# Patient Record
Sex: FEMALE | Race: WHITE | Employment: UNEMPLOYED | URBAN - METROPOLITAN AREA
[De-identification: names, ages, dates, MRNs, and addresses within clinical notes are randomized per-mention and may not be internally consistent; named-entity substitution may affect disease eponyms.]

---

## 2017-01-26 ENCOUNTER — HOSPITAL ENCOUNTER (INPATIENT)
Age: 22
LOS: 1 days | Discharge: HOME | DRG: 885 | End: 2017-01-27
Attending: EMERGENCY MEDICINE | Admitting: PSYCHIATRY & NEUROLOGY
Payer: COMMERCIAL

## 2017-01-26 VITALS
BODY MASS INDEX: 33.32 KG/M2 | WEIGHT: 200 LBS | DIASTOLIC BLOOD PRESSURE: 59 MMHG | RESPIRATION RATE: 16 BRPM | TEMPERATURE: 98.3 F | OXYGEN SATURATION: 98 % | HEART RATE: 85 BPM | SYSTOLIC BLOOD PRESSURE: 106 MMHG | HEIGHT: 65 IN

## 2017-01-26 DIAGNOSIS — R45.851 SUICIDAL IDEATIONS: Primary | ICD-10-CM

## 2017-01-26 PROBLEM — F32.89 DEPRESSIVE DISORDER, NOT ELSEWHERE CLASSIFIED: Status: ACTIVE | Noted: 2017-01-26

## 2017-01-26 LAB
AMPHET UR QL SCN: NEGATIVE
ANION GAP BLD CALC-SCNC: 9 MMOL/L (ref 3–18)
APAP SERPL-MCNC: <2 UG/ML (ref 10–30)
APPEARANCE UR: ABNORMAL
BACTERIA URNS QL MICRO: ABNORMAL /HPF
BARBITURATES UR QL SCN: NEGATIVE
BASOPHILS # BLD AUTO: 0 K/UL (ref 0–0.1)
BASOPHILS # BLD: 0 % (ref 0–2)
BENZODIAZ UR QL: NEGATIVE
BILIRUB UR QL: NEGATIVE
BUN SERPL-MCNC: 8 MG/DL (ref 7–18)
BUN/CREAT SERPL: 10 (ref 12–20)
CALCIUM SERPL-MCNC: 9.2 MG/DL (ref 8.5–10.1)
CANNABINOIDS UR QL SCN: NEGATIVE
CHLORIDE SERPL-SCNC: 105 MMOL/L (ref 100–108)
CO2 SERPL-SCNC: 26 MMOL/L (ref 21–32)
COCAINE UR QL SCN: NEGATIVE
COLOR UR: YELLOW
CREAT SERPL-MCNC: 0.79 MG/DL (ref 0.6–1.3)
DIFFERENTIAL METHOD BLD: ABNORMAL
EOSINOPHIL # BLD: 0.1 K/UL (ref 0–0.4)
EOSINOPHIL NFR BLD: 1 % (ref 0–5)
EPITH CASTS URNS QL MICRO: ABNORMAL /LPF (ref 0–5)
ERYTHROCYTE [DISTWIDTH] IN BLOOD BY AUTOMATED COUNT: 14.8 % (ref 11.6–14.5)
ETHANOL SERPL-MCNC: <3 MG/DL (ref 0–3)
GLUCOSE SERPL-MCNC: 106 MG/DL (ref 74–99)
GLUCOSE UR STRIP.AUTO-MCNC: NEGATIVE MG/DL
HCG UR QL: NEGATIVE
HCT VFR BLD AUTO: 44.9 % (ref 35–45)
HDSCOM,HDSCOM: NORMAL
HGB BLD-MCNC: 14.3 G/DL (ref 12–16)
HGB UR QL STRIP: NEGATIVE
KETONES UR QL STRIP.AUTO: NEGATIVE MG/DL
LEUKOCYTE ESTERASE UR QL STRIP.AUTO: ABNORMAL
LYMPHOCYTES # BLD AUTO: 34 % (ref 21–52)
LYMPHOCYTES # BLD: 3.4 K/UL (ref 0.9–3.6)
MCH RBC QN AUTO: 25.2 PG (ref 24–34)
MCHC RBC AUTO-ENTMCNC: 31.8 G/DL (ref 31–37)
MCV RBC AUTO: 79.2 FL (ref 74–97)
METHADONE UR QL: NEGATIVE
MONOCYTES # BLD: 0.5 K/UL (ref 0.05–1.2)
MONOCYTES NFR BLD AUTO: 5 % (ref 3–10)
MUCOUS THREADS URNS QL MICRO: ABNORMAL /LPF
NEUTS SEG # BLD: 5.9 K/UL (ref 1.8–8)
NEUTS SEG NFR BLD AUTO: 60 % (ref 40–73)
NITRITE UR QL STRIP.AUTO: NEGATIVE
OPIATES UR QL: NEGATIVE
PCP UR QL: NEGATIVE
PH UR STRIP: 5.5 [PH] (ref 5–8)
PLATELET # BLD AUTO: 271 K/UL (ref 135–420)
PMV BLD AUTO: 10.7 FL (ref 9.2–11.8)
POTASSIUM SERPL-SCNC: 3.6 MMOL/L (ref 3.5–5.5)
PROT UR STRIP-MCNC: NEGATIVE MG/DL
RBC # BLD AUTO: 5.67 M/UL (ref 4.2–5.3)
RBC #/AREA URNS HPF: NEGATIVE /HPF (ref 0–5)
SALICYLATES SERPL-MCNC: <2.8 MG/DL (ref 2.8–20)
SODIUM SERPL-SCNC: 140 MMOL/L (ref 136–145)
SP GR UR REFRACTOMETRY: 1.02 (ref 1–1.03)
UROBILINOGEN UR QL STRIP.AUTO: 0.2 EU/DL (ref 0.2–1)
WBC # BLD AUTO: 9.8 K/UL (ref 4.6–13.2)
WBC URNS QL MICRO: ABNORMAL /HPF (ref 0–4)

## 2017-01-26 PROCEDURE — 80307 DRUG TEST PRSMV CHEM ANLYZR: CPT | Performed by: EMERGENCY MEDICINE

## 2017-01-26 PROCEDURE — 65220000001 HC RM PRIVATE PSYCH

## 2017-01-26 PROCEDURE — 99284 EMERGENCY DEPT VISIT MOD MDM: CPT

## 2017-01-26 PROCEDURE — 81001 URINALYSIS AUTO W/SCOPE: CPT | Performed by: EMERGENCY MEDICINE

## 2017-01-26 PROCEDURE — 81025 URINE PREGNANCY TEST: CPT | Performed by: EMERGENCY MEDICINE

## 2017-01-26 PROCEDURE — 80048 BASIC METABOLIC PNL TOTAL CA: CPT | Performed by: EMERGENCY MEDICINE

## 2017-01-26 PROCEDURE — 85025 COMPLETE CBC W/AUTO DIFF WBC: CPT | Performed by: EMERGENCY MEDICINE

## 2017-01-26 RX ORDER — BENZTROPINE MESYLATE 1 MG/1
1-2 TABLET ORAL
Status: DISCONTINUED | OUTPATIENT
Start: 2017-01-26 | End: 2017-01-27 | Stop reason: HOSPADM

## 2017-01-26 RX ORDER — SERTRALINE HYDROCHLORIDE 100 MG/1
100 TABLET, FILM COATED ORAL DAILY
COMMUNITY

## 2017-01-26 RX ORDER — HALOPERIDOL 5 MG/1
5 TABLET ORAL
Status: DISCONTINUED | OUTPATIENT
Start: 2017-01-26 | End: 2017-01-27 | Stop reason: HOSPADM

## 2017-01-26 RX ORDER — BENZTROPINE MESYLATE 1 MG/ML
1-2 INJECTION INTRAMUSCULAR; INTRAVENOUS
Status: DISCONTINUED | OUTPATIENT
Start: 2017-01-26 | End: 2017-01-27 | Stop reason: HOSPADM

## 2017-01-26 RX ORDER — HALOPERIDOL 5 MG/ML
5 INJECTION INTRAMUSCULAR
Status: DISCONTINUED | OUTPATIENT
Start: 2017-01-26 | End: 2017-01-27 | Stop reason: HOSPADM

## 2017-01-26 RX ORDER — LORAZEPAM 1 MG/1
1-2 TABLET ORAL
Status: DISCONTINUED | OUTPATIENT
Start: 2017-01-26 | End: 2017-01-27 | Stop reason: HOSPADM

## 2017-01-26 RX ORDER — SERTRALINE HYDROCHLORIDE 50 MG/1
100 TABLET, FILM COATED ORAL DAILY
Status: DISCONTINUED | OUTPATIENT
Start: 2017-01-27 | End: 2017-01-27

## 2017-01-26 RX ORDER — LORAZEPAM 2 MG/ML
1-2 INJECTION INTRAMUSCULAR
Status: DISCONTINUED | OUTPATIENT
Start: 2017-01-26 | End: 2017-01-27 | Stop reason: HOSPADM

## 2017-01-26 RX ORDER — TRAZODONE HYDROCHLORIDE 50 MG/1
50 TABLET ORAL
Status: DISCONTINUED | OUTPATIENT
Start: 2017-01-26 | End: 2017-01-27 | Stop reason: HOSPADM

## 2017-01-26 RX ORDER — IBUPROFEN 600 MG/1
600 TABLET ORAL
Status: DISCONTINUED | OUTPATIENT
Start: 2017-01-26 | End: 2017-01-27 | Stop reason: HOSPADM

## 2017-01-26 NOTE — IP AVS SNAPSHOT
Current Discharge Medication List  
  
Take these medications at their scheduled times Dose & Instructions Dispensing Information Comments Morning Noon Evening Bedtime ZOLOFT 100 mg tablet Generic drug:  sertraline Your next dose is: Today, Tomorrow Other:  ____________ Dose:  100 mg Take 100 mg by mouth daily. Refills:  0
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
## 2017-01-26 NOTE — IP AVS SNAPSHOT
Summary of Care Report The Summary of Care report has been created to help improve care coordination. Users with access to View the Space or [address] (Web-based application) may access additional patient information including the Discharge Summary. If you are not currently a [address] user and need more information, please call the number listed below in the [place] [address] section and ask to be connected with Medical Records. Facility Information Name Address Phone [address] [facility]  [address] [facility] [id number] [phone] Patient Information Patient Name Sex  Colletta Rowels ([id number]) Female 1995 Discharge Information Admitting Provider Service Area Unit Alejandro Sprague MD / [address] 1 Adult Chem Dep / [phone] Discharge Provider Discharge Date/Time Discharge Disposition Destination (none) 2017 Afternoon (Pending) AHR (none) Patient Language Language ENGLISH [13] Problem List as of 2017  Never Reviewed Codes Priority Class Noted - Resolved Depressive disorder, not elsewhere classified ICD-10-CM: F32.9 ICD-9-CM: 899   2017 - Present You are allergic to the following No active allergies Current Discharge Medication List  
  
CONTINUE these medications which have NOT CHANGED Dose & Instructions Dispensing Information Comments ZOLOFT 100 mg tablet Generic drug:  sertraline Dose:  100 mg Take 100 mg by mouth daily. Refills:  0 Follow-up Information Follow up With Details Comments Contact Info None   None (395) Patient stated that they have no PCP  [address] [place] [address]  On 2017 Patient has an appointment scheduled with Aly Gunn at 8:00  a.m.  Ivonne Perez [address] 
 [place], [address] [facility] 
[phone] Discharge Instructions BEHAVIORAL HEALTH NURSING DISCHARGE NOTE Emergency Numbers [address] [facility] Desk: [phone] [facility] Emergency Services: [phone] Suicide Prevention 1 [phone] (TALK) The following personal items collected during your admission are returned to you:  
Dental Appliance: Dental Appliances: None Vision: Visual Aid: Glasses Hearing Aid:   
Jewelry: Jewelry: None Clothing:   
Other Valuables:   
Valuables sent to safe: The discharge information has been reviewed with the patient. The patient verbalized understanding. Swipe Telecomhart Activation Thank you for requesting access to Bazinga. Please follow the instructions below to securely access and download your online medical record. Bazinga allows you to send messages to your doctor, view your test results, renew your prescriptions, schedule appointments, and more. How Do I Sign Up? 1. In your internet browser, go to [email or web address] 
2. Click on the First Time User? Click Here link in the Sign In box. You will be redirect to the New Member Sign Up page. 3. Enter your Bazinga Access Code exactly as it appears below. You will not need to use this code after youve completed the sign-up process. If you do not sign up before the expiration date, you must request a new code. Bazinga Access Code: [email or web address] Expires: 2017  1:09 AM (This is the date your Bazinga access code will ) 4. Enter the last four digits of your Social Security Number (xxxx) and Date of Birth (mm/dd/yyyy) as indicated and click Submit. You will be taken to the next sign-up page. 5. Create a Bazinga ID. This will be your Bazinga login ID and cannot be changed, so think of one that is secure and easy to remember. 6. Create a Bazinga password. You can change your password at any time. 7. Enter your Password Reset Question and Answer.  This can be used at a later time if you forget your password. 8. Enter your e-mail address. You will receive e-mail notification when new information is available in [address] 9. Click Sign Up. You can now view and download portions of your medical record. 10. Click the Download Summary menu link to download a portable copy of your medical information. Additional Information If you have questions, please visit the Frequently Asked Questions section of the Buyt.In website at [email or web address] Remember, Buyt.In is NOT to be used for urgent needs. For medical emergencies, dial 911. Patient armband removed and shredded Chart Review Routing History No Routing History on File
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
## 2017-01-26 NOTE — BH NOTES
Van Lazaro is participating in Coping Skills Group. Group time: 30 minutes    Personal goal for participation: decrease anger    Goal orientation: social    Group therapy participation: active    Therapeutic interventions reviewed and discussed: Anger Map    Impression of participation: Pt.  Discuss  Coping skills to decrease  anger

## 2017-01-26 NOTE — ED NOTES
ADDENDUM      Patient's care was signed over to me at 0700 on 1/26/17. Patient's care signed over to me pending final disposition. Patient without complaints on my exam.   10:13 AM Dallin Zarate with crisis has evaluated pt and agrees with plan of admission. IMPRESSION:   1.  Suicidal ideations        DISPO: Admitted     Alex Jones MD

## 2017-01-26 NOTE — ED NOTES
Bedside and Verbal shift change report given to Jacob Cool RN (oncoming nurse) by Yong Ferreira RN (offgoing nurse). Report included the following information SBAR and ED Summary.

## 2017-01-26 NOTE — BH NOTES
Solange Mccain is participating in [employer]. Group time: 15 minutes    Personal goal for participation: Community Concerns    Goal orientation: community    Group therapy participation: active    Therapeutic interventions reviewed and discussed: Staff encouraged Pt. To report any repairs or Community Concerns    Impression of participation: Pt.  Had no repairs to report/community concerns at this time

## 2017-01-26 NOTE — ED TRIAGE NOTES
Pt states I am having suicidal thought since yesterday at 8pm to slit my wrist. Pt transported to ER by [employer].

## 2017-01-26 NOTE — IP AVS SNAPSHOT
Nazanin Jessa 
 
 
 [address] [facility] [address] Patient: Ashtyn Giles MRN: [id number] :1995 You are allergic to the following No active allergies Recent Documentation Height Weight Breastfeeding? BMI OB Status Smoking Status 1.651 m 90.7 kg No 33.28 kg/m2 Unknown Current Some Day Smoker Emergency Contacts Name Discharge Info Relation Home Work Mobile Gagan Abarca  Friend [5] [phone] About your hospitalization You were admitted on:  2017 You last received care in the:  [facility] 1 ADULT CHEM DEP You were discharged on:  2017 Unit phone number:  [phone] Why you were hospitalized Your primary diagnosis was:  Not on File Your diagnoses also included:  Depressive Disorder, Not Elsewhere Classified Providers Seen During Your Hospitalizations Provider Role Specialty Primary office phone James Urrutia MD Attending Provider Emergency Medicine [phone] Rayna Gomez MD Attending Provider Emergency Medicine [phone] Carley Corey MD Attending Provider Psychiatry [phone] Your Primary Care Physician (PCP) Primary Care Physician Office Phone Office Fax NONE ** None ** ** None ** Follow-up Information Follow up With Details Comments Contact Info None   None (395) Patient stated that they have no PCP [address] [place] [address]  On 2017 Patient has an appointment scheduled with Nemesio Murillo at 8:00  a.m.  [address] [place] [address], [address] [facility] 
[phone] Current Discharge Medication List  
  
CONTINUE these medications which have NOT CHANGED Dose & Instructions Dispensing Information Comments Morning Noon Evening Bedtime ZOLOFT 100 mg tablet Generic drug:  sertraline Your next dose is: Today, Tomorrow Other:  _________ Dose:  100 mg Take 100 mg by mouth daily. Refills:  0 Discharge Instructions BEHAVIORAL HEALTH NURSING DISCHARGE NOTE Emergency Numbers [facility] [facility] Desk: [phone] [place] Emergency Services: [phone] Suicide Prevention 1 [phone] (TALK) The following personal items collected during your admission are returned to you:  
Dental Appliance: Dental Appliances: None Vision: Visual Aid: Glasses Hearing Aid:   
Jewelry: Jewelry: None Clothing:   
Other Valuables:   
Valuables sent to safe: The discharge information has been reviewed with the patient. The patient verbalized understanding. Mydish Activation Thank you for requesting access to Mydish. Please follow the instructions below to securely access and download your online medical record. Mydish allows you to send messages to your doctor, view your test results, renew your prescriptions, schedule appointments, and more. How Do I Sign Up? 1. In your internet browser, go to [email or web address] 
2. Click on the First Time User? Click Here link in the Sign In box. You will be redirect to the New Member Sign Up page. 3. Enter your Mydish Access Code exactly as it appears below. You will not need to use this code after youve completed the sign-up process. If you do not sign up before the expiration date, you must request a new code. Mydish Access Code: [email or web address] Expires: 2017  1:09 AM (This is the date your Mydish access code will ) 4. Enter the last four digits of your Social Security Number (xxxx) and Date of Birth (mm/dd/yyyy) as indicated and click Submit. You will be taken to the next sign-up page. 5. Create a Mydish ID. This will be your Mydish login ID and cannot be changed, so think of one that is secure and easy to remember. 6. Create a Mydish password. You can change your password at any time. 7. Enter your Password Reset Question and Answer. This can be used at a later time if you forget your password. 8. Enter your e-mail address. You will receive e-mail notification when new information is available in [address] 9. Click Sign Up. You can now view and download portions of your medical record. 10. Click the Download Summary menu link to download a portable copy of your medical information. Additional Information If you have questions, please visit the Frequently Asked Questions section of the Capeco website at [email or web address] Remember, Capeco is NOT to be used for urgent needs. For medical emergencies, dial 911. Patient armband removed and shredded Discharge Orders None Capeco Announcement We are excited to announce that we are making your provider's discharge notes available to you in Capeco. You will see these notes when they are completed and signed by the physician that discharged you from your recent hospital stay. If you have any questions or concerns about any information you see in Capeco, please call the Health Information Department where you were seen or reach out to your Primary Care Provider for more information about your plan of care. Introducing [facility]! Corry Garcia introduces Capeco patient portal. Now you can access parts of your medical record, email your doctor's office, and request medication refills online. 1. In your internet browser, go to [email or web address] 2. Click on the First Time User? Click Here link in the Sign In box. You will see the New Member Sign Up page. 3. Enter your Capeco Access Code exactly as it appears below. You will not need to use this code after youve completed the sign-up process. If you do not sign up before the expiration date, you must request a new code. · Capeco Access Code: [email or web address] Expires: 4/26/2017  1:09 AM 
 
 4. Enter the last four digits of your Social Security Number (xxxx) and Date of Birth (mm/dd/yyyy) as indicated and click Submit. You will be taken to the next sign-up page. 5. Create a GeoPal Solutions ID. This will be your [employer] Solutions login ID and cannot be changed, so think of one that is secure and easy to remember. 6. Create a [employer] Solutions password. You can change your password at any time. 7. Enter your Password Reset Question and Answer. This can be used at a later time if you forget your password. 8. Enter your e-mail address. You will receive e-mail notification when new information is available in [address] 9. Click Sign Up. You can now view and download portions of your medical record. 10. Click the Download Summary menu link to download a portable copy of your medical information. If you have questions, please visit the Frequently Asked Questions section of the [employer] website. Remember, [employer]Pal Solutions is NOT to be used for urgent needs. For medical emergencies, dial 911. Now available from your iPhone and Android! General Information Please provide this summary of care documentation to your next provider. Patient Signature:  ____________________________________________________________ Date:  ____________________________________________________________  
  
[facility] Provider Signature:  ____________________________________________________________ Date:  ____________________________________________________________

## 2017-01-27 PROCEDURE — 74011250637 HC RX REV CODE- 250/637: Performed by: PSYCHIATRY & NEUROLOGY

## 2017-01-27 RX ORDER — SERTRALINE HYDROCHLORIDE 25 MG/1
25 TABLET, FILM COATED ORAL DAILY
Status: DISCONTINUED | OUTPATIENT
Start: 2017-01-27 | End: 2017-01-27 | Stop reason: HOSPADM

## 2017-01-27 RX ADMIN — SERTRALINE HYDROCHLORIDE 25 MG: 25 TABLET ORAL at 12:30

## 2017-01-27 NOTE — DISCHARGE SUMMARY
[facility]  Discharge Summary     Patient ID:  Ashtyn Giles  [id number]  46 y.o.  1995    Admit date: 1/26/2017    Discharge date and time: 1/27/17  1500      Admission Diagnoses: Depressive disorder, not elsewhere classified    Discharge Diagnoses:   Problem List as of 1/27/2017  Never Reviewed          Codes Class Noted - Resolved    Depressive disorder, not elsewhere classified ICD-10-CM: F32.9  ICD-9-CM: 315  1/26/2017 - Present              Disposition: home    Discharged Condition: stable    History reviewed. No pertinent past medical history. Family History   Problem Relation Age of Onset    No Known Problems Mother     No Known Problems Father     No Known Problems Sister     No Known Problems Brother       Social History   Substance Use Topics    Smoking status: Current Some Day Smoker     Packs/day: 0.25     Years: 3.00    Smokeless tobacco: Never Used    Alcohol use No     History reviewed. No pertinent past surgical history. Prior to Admission medications    Medication Sig Start Date End Date Taking? Authorizing Provider   sertraline (ZOLOFT) 100 mg tablet Take 100 mg by mouth daily. Phys Other, MD     No Known Allergies     Hospital Course: The patient was admitted to the [facility]  on suicide  precautions. The patient was engaged in individual, group, art  therapies, and occupational therapy. At  the time of discharge, the patient denied homicidal or suicidal ideation. Patient  was not psychotic and was capable of self care and competent to make their own financial and medical decisions. The patient has an understanding of treatment recommendations and medication management on discharge. Discharge Exams:  Mental Status exam: WNL   This afternoon the pt denies  suicidal ideation and says she will stay  in control- she wants to go home because she doesn't want to  miss her baby's first steps this weekend.  Pt has her sertraline at home   Physical exam: No exam performed today, . Lab/Data Review:  glucose  106       Consultations (including impressions and outcomes): none     Psychiatric Testing none    Treatment and Response: began sertraline 25 mg in hospital     Significant adverse reaction to drugs: none    Procedures/Operations:     Current Discharge Medication List      CONTINUE these medications which have NOT CHANGED    Details   sertraline (ZOLOFT) 100 mg tablet Take 100 mg by mouth daily.                Activity: Activity as tolerated  Diet: Regular Diet      Follow-up with: as arranged by nursing staff      Signed:  Veronia Osler, MD  1/27/2017  2:37 PM

## 2017-01-27 NOTE — BH NOTES
Pt. appears calm and cooperative in the milieu socializing with staff and peers. Pt. denies SI/HI. AV/H. Pt contracts for safety on the unit. Pt.denies any new medical/pain issues. Pt. completed ADL's. Pt. ate 100% of meals and has been compliant with medications. Pt. participate in community and anger group. Pt. Received a visit from a male friend. Staff encouraged Pt. to  participate in treatment and  medication therapy. Pt agreed. Pt. remain free of falls and provided non skid socks. Staff will continue to monitor Pt. for behavior safety and location.
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
## 2017-01-27 NOTE — BH NOTES
Marlene Vik is not participating in Substance abuse. Group time: 1 hour    Personal goal for participation: Educate on Substance abuse    Goal orientation: passive    Group therapy participation: refuse    Therapeutic interventions reviewed and discussed:  Staff encouraged Pt. To participate in group    Impression of participation: Pt. Refuse chose to rest in bed despite staff encouragement.

## 2017-01-27 NOTE — BH NOTES
GROUP THERAPY PROGRESS NOTE    Carol Tang is participating in [place]. Group time: 30 minutes    Personal goal for participation: I do not have a goal    Goal orientation: personal    Group therapy participation: minimal    Therapeutic interventions reviewed and discussed: Her affect was alert but minimal conversation during group. Impression of participation: Pt was educated on what a goal was and how to make goals that are attainable during this group.

## 2017-01-27 NOTE — DISCHARGE INSTRUCTIONS
BEHAVIORAL HEALTH NURSING DISCHARGE NOTE      Emergency Numbers  : [facility]: [phone]   Suicide Prevention 7 [phone] (TALK)      The following personal items collected during your admission are returned to you:   Dental Appliance: Dental Appliances: None  Vision: Visual Aid: Glasses  Hearing Aid:    Jewelry: Jewelry: None  Clothing:    Other Valuables:    Valuables sent to safe: The discharge information has been reviewed with the patient. The patient verbalized understanding. [employer] Activation    Thank you for requesting access to [employer]. Please follow the instructions below to securely access and download your online medical record. [employer] allows you to send messages to your doctor, view your test results, renew your prescriptions, schedule appointments, and more. How Do I Sign Up? 1. In your internet browser, go to [email or web address]  2. Click on the First Time User? Click Here link in the Sign In box. You will be redirect to the New Member Sign Up page. 3. Enter your [employer] Access Code exactly as it appears below. You will not need to use this code after youve completed the sign-up process. If you do not sign up before the expiration date, you must request a new code. [employer] Access Code: [email or web address]  Expires: 2017  1:09 AM (This is the date your [employer] access code will )    4. Enter the last four digits of your Social Security Number (xxxx) and Date of Birth (mm/dd/yyyy) as indicated and click Submit. You will be taken to the next sign-up page. 5. Create a [employer] ID. This will be your [employer] login ID and cannot be changed, so think of one that is secure and easy to remember. 6. Create a [employer] password. You can change your password at any time. 7. Enter your Password Reset Question and Answer. This can be used at a later time if you forget your password. 8. Enter your e-mail address.  You will receive e-mail notification when new information is available in [address] 9. Click Sign Up. You can now view and download portions of your medical record. 10. Click the Download Summary menu link to download a portable copy of your medical information. Additional Information    If you have questions, please visit the Frequently Asked Questions section of the [employer] website at [email or web address] Remember, [employer] is NOT to be used for urgent needs. For medical emergencies, dial 911.     Patient armband removed and shredded

## 2017-01-27 NOTE — BH NOTES
Patient has been discharged form and will follow up with [facility]. Patient has been provided with follow up appointment information with verbalization of satisfaction. Patient has been provided with emergency numbers to seek additional support if needed. Patient has been escorted off unit after visitation with friend that was visiting with patient.

## 2017-01-27 NOTE — BH NOTES
SW Contact: pt. Is a 25year old female with history of depression and anxiety disorder. Pt. Was admitted to this facility following a suicide overdose. pt. admits the trigger was finding out her boyfriend 's mother is accusing pt, of being incompetent and trying to take her 11 month old son away from her. SW met with the pt. And discussed self-efficacy, positive coping skills, positive conflict resolution and aftercare. Pt. Stated she is willing to obtain counseling. pt. Is currently denying ideations and hallucinations. Pt.plans to return to her home her mother, [de-identified] old and boyfriend. Pt. Will follow-up aftercare with Catrina. Pt has  an appointment scheduled on 2/2/17  @ 8:00 a.m.

## 2017-01-27 NOTE — BSMART NOTE
OCCUPATIONAL THERAPY PROGRESS NOTE  Group Time:  0621  Attendance: The patient attended full group. Participation:  The patient participated fully in the activity. Attention:  The patient was able to focus on the activity. Interaction:  The patient occasionally  interacts with others. Sherin Wick

## 2017-01-27 NOTE — BH NOTES
ERIKA Note: -S/O-The above pt has been pleasant upon approach this shift. She has not been a management problem this shift. She verbally contract for safety and denies any self-harm at this time. She has not experienced any falls on this shift. -A-Problem #1 cont. -P-Maintain a safe and supportive environment.

## 2017-01-28 NOTE — BH NOTES
Celeste Tierney is not participating in [place]. Group time: 15 minutes    Personal goal for participation: Community announcement    Goal orientation: community    Group therapy participation: refuse    Therapeutic interventions reviewed and discussed: Staff encouraged Pt. To participate in group    Impression of participation: Pt.  Refuse, chose to talk on the phone arranging transportation to be discharge